# Patient Record
Sex: MALE | Race: BLACK OR AFRICAN AMERICAN | NOT HISPANIC OR LATINO | URBAN - METROPOLITAN AREA
[De-identification: names, ages, dates, MRNs, and addresses within clinical notes are randomized per-mention and may not be internally consistent; named-entity substitution may affect disease eponyms.]

---

## 2019-04-18 ENCOUNTER — TELEPHONE (OUTPATIENT)
Dept: INTERNAL MEDICINE | Facility: CLINIC | Age: 54
End: 2019-04-18

## 2019-04-18 ENCOUNTER — OFFICE VISIT (OUTPATIENT)
Dept: INTERNAL MEDICINE | Facility: CLINIC | Age: 54
End: 2019-04-18
Payer: COMMERCIAL

## 2019-04-18 ENCOUNTER — HOSPITAL ENCOUNTER (OUTPATIENT)
Dept: RADIOLOGY | Facility: OTHER | Age: 54
Discharge: HOME | End: 2019-04-18
Attending: FAMILY MEDICINE
Payer: COMMERCIAL

## 2019-04-18 VITALS
OXYGEN SATURATION: 98 % | HEIGHT: 77 IN | WEIGHT: 202.63 LBS | DIASTOLIC BLOOD PRESSURE: 79 MMHG | SYSTOLIC BLOOD PRESSURE: 118 MMHG | BODY MASS INDEX: 23.92 KG/M2 | TEMPERATURE: 98 F | HEART RATE: 72 BPM

## 2019-04-18 DIAGNOSIS — F39 MOOD DISORDER: ICD-10-CM

## 2019-04-18 DIAGNOSIS — J30.9 CHRONIC ALLERGIC RHINITIS: ICD-10-CM

## 2019-04-18 DIAGNOSIS — G89.29 CHRONIC NECK PAIN: ICD-10-CM

## 2019-04-18 DIAGNOSIS — M54.2 CHRONIC NECK PAIN: ICD-10-CM

## 2019-04-18 DIAGNOSIS — Z00.00 ANNUAL PHYSICAL EXAM: Primary | ICD-10-CM

## 2019-04-18 DIAGNOSIS — Z13.1 SCREENING FOR DIABETES MELLITUS: ICD-10-CM

## 2019-04-18 DIAGNOSIS — Z87.898 HISTORY OF SNORING: ICD-10-CM

## 2019-04-18 DIAGNOSIS — N52.9 ERECTILE DYSFUNCTION, UNSPECIFIED ERECTILE DYSFUNCTION TYPE: ICD-10-CM

## 2019-04-18 DIAGNOSIS — R22.1 NECK MASS: ICD-10-CM

## 2019-04-18 DIAGNOSIS — Z13.220 SCREENING FOR LIPID DISORDERS: ICD-10-CM

## 2019-04-18 PROCEDURE — 99999 PR PBB SHADOW E&M-NEW PATIENT-LVL IV: CPT | Mod: PBBFAC,,, | Performed by: FAMILY MEDICINE

## 2019-04-18 PROCEDURE — 99386 PREV VISIT NEW AGE 40-64: CPT | Mod: S$GLB,,, | Performed by: FAMILY MEDICINE

## 2019-04-18 PROCEDURE — 72050 X-RAY EXAM NECK SPINE 4/5VWS: CPT | Mod: 26,,, | Performed by: RADIOLOGY

## 2019-04-18 PROCEDURE — 72050 X-RAY EXAM NECK SPINE 4/5VWS: CPT | Mod: TC,FY

## 2019-04-18 PROCEDURE — 99386 PR PREVENTIVE VISIT,NEW,40-64: ICD-10-PCS | Mod: S$GLB,,, | Performed by: FAMILY MEDICINE

## 2019-04-18 PROCEDURE — 99999 PR PBB SHADOW E&M-NEW PATIENT-LVL IV: ICD-10-PCS | Mod: PBBFAC,,, | Performed by: FAMILY MEDICINE

## 2019-04-18 PROCEDURE — 72050 XR CERVICAL SPINE COMPLETE 5 VIEW: ICD-10-PCS | Mod: 26,,, | Performed by: RADIOLOGY

## 2019-04-18 RX ORDER — PAROXETINE 10 MG/1
10 TABLET, FILM COATED ORAL EVERY MORNING
Qty: 30 TABLET | Refills: 1 | Status: SHIPPED | OUTPATIENT
Start: 2019-04-18

## 2019-04-18 RX ORDER — FLUTICASONE PROPIONATE 50 MCG
2 SPRAY, SUSPENSION (ML) NASAL DAILY
Qty: 16 G | Refills: 2 | Status: SHIPPED | OUTPATIENT
Start: 2019-04-18

## 2019-04-18 RX ORDER — SILDENAFIL 100 MG/1
50 TABLET, FILM COATED ORAL DAILY PRN
Qty: 6 TABLET | Refills: 3 | Status: SHIPPED | OUTPATIENT
Start: 2019-04-18 | End: 2019-05-08 | Stop reason: SDUPTHER

## 2019-04-18 NOTE — TELEPHONE ENCOUNTER
Please let the patient know I have reviewed his lab results and XR report.  His lab results look terrific, and essentially everything is normal, including blood counts, electrolytes, blood sugar, cholesterol levels, and kidney/liver/thyroid function.  He is doing a great job of taking care of his health.  His testosterone level is actually very normal and in fact much higher than I would have expected, and certainly is not the explanation for his concerns as discussed in clinic.  His neck XR is showing mild arthritis, which is very common and not worrisome at all.  There is a small area of calcification on the front of the neck, which is difficult to identify.  At this point it does not look like anything to worry about, but let's go ahead with a CT scan to evaluate further.  There are no additional details to go off of on that.  We can also plan to discuss results in more detail at his next visit.  Please help to schedule the CT.  Thanks!

## 2019-04-18 NOTE — PATIENT INSTRUCTIONS
Prevention Guidelines, Men Ages 50 to 64  Screening tests and vaccines are an important part of managing your health. Health counseling is essential, too. Below are guidelines for these, for men ages 50 to 64. Talk with your healthcare provider to make sure youre up-to-date on what you need.  Screening Who needs it How often   Alcohol misuse All men in this age group At routine exams   Blood pressure All men in this age group Every 2 years if your blood pressure is less than 120/80 mm Hg; yearly if your systolic blood pressure is 120 to 139 mm Hg, or your diastolic blood pressure reading is 80 to 89 mm Hg   Colorectal cancer All men in this age group Flexible sigmoidoscopy every 5 years, or colonoscopy every 10 years, or double-contrast barium enema every 5 years; yearly fecal occult blood test or fecal immunochemical test; or a stool DNA test as often as your healthcare provider advises; talk with your healthcare provider about which tests are best for you   Depression All men in this age group At routine exams   Type 2 diabetes or prediabetes All adults beginning at age 45 and adults without symptoms at any age who are overweight or obese and have 1 or more other risk factors for diabetes At least every 3 years (yearly if your blood sugar has already begun to rise)   Hepatitis C Men at increased risk for infection - talk with your healthcare provider At routine exams. All men ages 50 to 70 should be tested at least once for hepatitis C.   High cholesterol or triglycerides All men in this age group At least every 5 years   HIV Men at increased risk for infection - talk with your healthcare provider At routine exams   Lung cancer Adults age 55 to 80 who have smoked Yearly screening in smokers with 30 pack-year history of smoking or who quit within 15 years   Obesity All men in this age group At routine exams   Prostate cancer Starting at age 45, talk to healthcare provider about risks and benefits of digital  rectal exam (HESHAM) and prostate-specific antigen (PSA) screening1 At routine exams   Syphilis Men at increased risk for infection - talk with your healthcare provider At routine exams   Tuberculosis Men at increased risk for infection - talk with your healthcare provider Ask your healthcare provider   Vision All men in this age group Ask your healthcare provider   Vaccine Who needs it How often   Chickenpox (varicella) All men in this age group who have no record of this infection or vaccine 2 doses; second dose should be given at least 4 weeks after the first dose   Hepatitis A Men at increased risk for infection - talk with your healthcare provider 2 doses given at least 6 months apart   Hepatitis B Men at increased risk for infection - talk with your healthcare provider 3 doses over 6 months; second dose should be given 1 month after the first dose; the third dose should be given at least 2 months after the second dose and at least 4 months after the first dose   Haemophilus influenzae Type B (HIB) Men at increased risk for infection - talk with your healthcare provider 1 to 3 doses   Influenza (flu) All men in this age group Once a year   Measles, mumps, rubella (MMR) Men in this age group through their late 50s who have no record of these infections or vaccines 1 or 2 doses; ask your healthcare provider   Meningococcal Men at increased risk for infection - talk with your healthcare provider 1 or more doses   Pneumococcal conjugate vaccine (PCV13) and pneumococcal polysaccharide vaccine (PPSV23) Men at increased risk for infection - talk with your healthcare provider PCV13: 1 dose ages 19 to 65 (protects against 13 types of pneumococcal bacteria)     PPSV23: 1 to 2 doses through age 64, or 1 dose at 65 or older (protects against 23 types of pneumococcal bacteria)      Tetanus/diphtheria/  pertussis (Td/Tdap) booster All men in this age group Td every 10 years, or a one-time dose of Tdap instead of a Td booster  after age 18, then Td every 10 years   Zoster All men ages 60 and older 1 dose   Counseling Who needs it How often   Diet and exercise Men who are overweight or obese When diagnosed, and then at routine exams   Sexually transmitted infection prevention Men at increased risk for infection - talk with your healthcare provider At routine exams   Use of daily aspirin Men in this age group at risk for cardiovascular health problems At routine exams   Use of tobacco and the health effects it can cause All men in this age group Every visit   [address] [place] [address] [facility]  Date Last Reviewed: 2/1/2017 © 2000-2017 [employer]. [address] [place] [address]. All rights reserved. This information is not intended as a substitute for professional medical care. Always follow your healthcare professional's instructions.        Paroxetine tablets  What is this medicine?  PAROXETINE (pa JARED e teen) is used to treat depression. It may also be used to treat anxiety disorders, obsessive compulsive disorder, panic attacks, post traumatic stress, and premenstrual dysphoric disorder (PMDD).  How should I use this medicine?  Take this medicine by mouth with a glass of water. Follow the directions on the prescription label. You can take it with or without food. Take your medicine at regular intervals. Do not take your medicine more often than directed. Do not stop taking this medicine suddenly except upon the advice of your doctor. Stopping this medicine too quickly may cause serious side effects or your condition may worsen.  A special MedGuide will be given to you by the pharmacist with each prescription and refill. Be sure to read this information carefully each time.  Talk to your pediatrician regarding the use of this medicine in children. Special care may be needed.  What side effects may I notice from receiving this medicine?  Side effects that you should report to your doctor or health care  professional as soon as possible:  · allergic reactions like skin rash, itching or hives, swelling of the face, lips, or tongue  · black or bloody stools, blood in the urine or vomit  · fast, irregular heartbeat  · hallucination, loss of contact with reality  · painful or prolonged erection (men)  · seizures  · suicidal thoughts or other mood changes  · trouble passing urine or change in the amount of urine  · unusual bleeding or bruising  · unusually weak or tired  · vomiting  Side effects that usually do not require medical attention (report to your doctor or health care professional if they continue or are bothersome):  · change in appetite, weight  · change in sex drive or performance  · constipation or diarrhea  · difficulty sleeping  · drowsy  · headache  · increased sweating  · muscle pain or weakness  · tremors  What may interact with this medicine?  Do not take this medicine with any of the following medications:  · linezolid  · MAOIs like Carbex, Eldepryl, Marplan, Nardil, and Parnate  · methylene blue (injected into a vein)  · pimozide  · thioridazine  This medicine may also interact with the following medications:  · alcohol  · aspirin and aspirin-like medicines  · atomoxetine  · certain medicines for depression, anxiety, or psychotic disturbances  · certain medicines for irregular heart beat like propafenone, flecainide, encainide, and quinidine  · certain medicines for migraine headache like almotriptan, eletriptan, frovatriptan, naratriptan, rizatriptan, sumatriptan, zolmitriptan  · cimetidine  · digoxin  · diuretics  · fentanyl  · fosamprenavir/ritonavir  · furazolidone  · isoniazid  · lithium  · medicines that treat or prevent blood clots like warfarin, enoxaparin, and dalteparin  · medicines for sleep  · metoprolol  · NSAIDs, medicines for pain and inflammation, like ibuprofen or naproxen  · phenobarbital  · phenytoin  · procarbazine  · procyclidine  · rasagiline  · supplements like Mala's  wort, kava kava, valerian  · tamoxifen  · theophylline  · tramadol  · tryptophan  What if I miss a dose?  If you miss a dose, take it as soon as you can. If it is almost time for your next dose, take only that dose. Do not take double or extra doses.  Where should I keep my medicine?  Keep out of the reach of children.  Store at room temperature between 15 and 30 degrees C (59 and 86 degrees F). Keep container tightly closed. Throw away any unused medicine after the expiration date.  What should I tell my health care provider before I take this medicine?  They need to know if you have any of these conditions:  · bleeding disorders  · glaucoma  · heart disease  · kidney disease  · liver disease  · low levels of sodium in the blood  · allyssa or bipolar disorder  · seizures  · suicidal thoughts, plans, or attempt; a previous suicide attempt by you or a family member  · take MAOIs like Carbex, Eldepryl, Marplan, Nardil, and Parnate  · take medicines that treat or prevent blood clots  · an unusual or allergic reaction to paroxetine, other medicines, foods, dyes, or preservatives  · pregnant or trying to get pregnant  · breast-feeding  What should I watch for while using this medicine?  Tell your doctor if your symptoms do not get better or if they get worse. Visit your doctor or health care professional for regular checks on your progress. Because it may take several weeks to see the full effects of this medicine, it is important to continue your treatment as prescribed by your doctor.  Patients and their families should watch out for new or worsening thoughts of suicide or depression. Also watch out for sudden changes in feelings such as feeling anxious, agitated, panicky, irritable, hostile, aggressive, impulsive, severely restless, overly excited and hyperactive, or not being able to sleep. If this happens, especially at the beginning of treatment or after a change in dose, call your health care professional.  You may  get drowsy or dizzy. Do not drive, use machinery, or do anything that needs mental alertness until you know how this medicine affects you. Do not stand or sit up quickly, especially if you are an older patient. This reduces the risk of dizzy or fainting spells. Alcohol may interfere with the effect of this medicine. Avoid alcoholic drinks.  Your mouth may get dry. Chewing sugarless gum or sucking hard candy, and drinking plenty of water will help. Contact your doctor if the problem does not go away or is severe.  NOTE:This sheet is a summary. It may not cover all possible information. If you have questions about this medicine, talk to your doctor, pharmacist, or health care provider. Copyright© 2017 [employer]

## 2019-04-18 NOTE — LETTER
April 18, 2019      [facility]/ Internal Medicine  [address] [place] [address]  Phone: [phone]  Fax: [phone]       Patient: Carter Neves   YOB: 1965  Date of Visit: 04/18/2019    To Whom It May Concern:    Josué Neves  was at [facility] on 04/18/2019. He may return to work/school on 4/19/19 with no restrictions. If you have any questions or concerns, or if I can be of further assistance, please do not hesitate to contact me.    Sincerely,    Fanny Perry LPN

## 2019-04-18 NOTE — PROGRESS NOTES
[facility]  Clinic Note      Subjective:       Patient ID: Carter Neves is a 53 y.o. male.    Chief Complaint: Establish Care    New patient is here today for annual visit and to establish care.  He notes several concerns at present.  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  He notes he is overdue for an annual checkup, although he did have a colonoscopy three years ago and reports it was normal.  He denies any urinary symptoms.  He notes his biggest concern is ongoing chronic mood disturbance.  He is here today with his girlfriend, who provides much of the history.  He had a recent divorce and his relationship with his daughter has become estranged, which troubles him greatly.  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  This has caused intermittent panic episodes, with classic chest discomfort and subjective dyspnea.  He has more chronic depression symptoms, and several months of dysphoria and anhedonia.  He has chronic erectile dysfunction and fatigue.  He has never had any treatment with depression or anxiety medications.  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  He has prior had Viagra for the ED symptoms, and notes it was effective.  He has had progressive left shoulder pain and associated muscle stiffness in the neck and trapezius.  This is associated with chronic neck pain and intermittent headache which is uncertain to be related.  He denies any motor weakness, radiculopathy, or paresthesias.  He has had a chronic cough and endorses allergic rhinitis symptoms.  No recent illness or fever.    Review of Systems   Constitutional: Positive for fatigue. Negative for fever.   HENT: Positive for congestion, postnasal drip and rhinorrhea.  "Negative for sinus pressure, sinus pain and sore throat.    Respiratory: Positive for cough and wheezing (intermittent). Negative for shortness of breath.    Cardiovascular: Negative for chest pain and palpitations.   Gastrointestinal: Negative for abdominal pain, diarrhea, nausea and vomiting.   Genitourinary: Negative for difficulty urinating, dysuria and hematuria.   Musculoskeletal: Positive for back pain, myalgias, neck pain and neck stiffness. Negative for arthralgias.   Neurological: Positive for headaches. Negative for dizziness and syncope.   Psychiatric/Behavioral: Positive for dysphoric mood. Negative for sleep disturbance. The patient is nervous/anxious.        Objective:      /79   Pulse 72   Temp 98.2 °F (36.8 °C)   Ht 6' 5" (1.956 m)   Wt 91.9 kg (202 lb 9.6 oz)   SpO2 98%   BMI 24.03 kg/m²   Physical Exam   Constitutional: He is oriented to person, place, and time. He appears well-developed and well-nourished. No distress.   HENT:   Head: Normocephalic and atraumatic.   Right Ear: Tympanic membrane and ear canal normal. Tympanic membrane is not erythematous. No middle ear effusion.   Left Ear: Tympanic membrane and ear canal normal. Tympanic membrane is not erythematous.  No middle ear effusion.   Nose: Mucosal edema and rhinorrhea present. Right sinus exhibits no maxillary sinus tenderness and no frontal sinus tenderness. Left sinus exhibits no maxillary sinus tenderness and no frontal sinus tenderness.   Mouth/Throat: Mucous membranes are normal. Posterior oropharyngeal erythema present. No posterior oropharyngeal edema.   Eyes: Pupils are equal, round, and reactive to light. Conjunctivae are normal.   Neck: Normal range of motion. No thyromegaly present.   Cardiovascular: Normal rate and regular rhythm.   No murmur heard.  Pulmonary/Chest: Effort normal and breath sounds normal. No respiratory distress. He has no wheezes. He has no rales.   Abdominal: Soft. Bowel sounds are normal. He " exhibits no distension. There is no tenderness. There is no guarding.   Musculoskeletal: He exhibits no edema.        Left shoulder: He exhibits normal range of motion, no tenderness, no bony tenderness, no deformity, no pain, no spasm, normal pulse and normal strength.        Cervical back: He exhibits decreased range of motion, tenderness, pain and spasm. He exhibits no bony tenderness and no deformity.   Diffuse left trapezius tightness and mild TTP.  Left shoulder:  Full Can/Empty Can: negative  Speed/Yergason: negative  Neer/Michel: negative  Apley/Cross-arm: negative  Full motor and sensation in left arm/hand intact   Neurological: He is alert and oriented to person, place, and time. No cranial nerve deficit.   Skin: Skin is warm and dry. No rash noted.   Psychiatric: He has a normal mood and affect. His behavior is normal.   Nursing note and vitals reviewed.      Assessment:       1. Annual physical exam    2. Screening for diabetes mellitus    3. Screening for lipid disorders    4. Mood disorder    5. Chronic neck pain    6. Erectile dysfunction, unspecified erectile dysfunction type    7. Chronic allergic rhinitis    8. History of snoring        Plan:     1. Annual physical exam  2. Screening for diabetes mellitus  3. Screening for lipid disorders  - routine health maintenance counseling provided, health history reviewed, preventive health testing recommended for age reviewed and tests ordered below   - - Lipid panel; Future  - - Hemoglobin A1c; Future  - - Comprehensive metabolic panel; Future    4. Mood disorder  - history reviewed, differential reviewed, mixed chronic depression and anxiety  - non-pharmacologic treatment measures reviewed, including benefit of routine/structured exercise program and therapy/counseling  - treatment options reviewed, basic pharmacology reviewed, have recommended SSRI trial and dosing instructions and potential side effects reviewed, handout provided   - - paroxetine  (PAXIL) 10 MG tablet; Take 1 tablet (10 mg total) by mouth every morning.  Dispense: 30 tablet; Refill: 1  - will monitor clinical course and response to treatment, have advised that additional options for as-needed anxiolysis are going to be greatly limited by his profession as a , patient expressed understanding  - questions answered, warning signs reviewed, patient is comfortable with this plan and was encouraged to call the office for any concerns or worsening of condition     5. Chronic neck pain  - history and exam findings reviewed, very likely trapezius strain as exam is quite reassuring  - evaluation options reviewed, have advised we can order an XR but would recommend to defer imaging unless/until any paresthesias or weakness in his arm or hand, patient expressed understanding  - - X-Ray Cervical Spine Complete 5 view; Future  - supportive care measures reviewed  - treatment options reviewed, options limited by profession as per above, for now would suggest trial of OTC topical analgesics and depending on response we discussed a possible trial of compounded pain medication from [facility] if needed    6. Erectile dysfunction, unspecified erectile dysfunction type  - chronic, likely multifactorial, basic pathophysiology reviewed  - evaluation options reviewed, will check labs for any potential underlying cause  - - CBC auto differential; Future  - - Testosterone; Future  - - TSH; Future  - treatment options reviewed, if low testosterone present would be able to address that, but advised it will likely come down to PDE-5i trial and dosing instructions and potential side effects reviewed, patient is interested in local pharmacy discount program, Rx for this medication sent to [facility]  - - sildenafil (VIAGRA) 100 MG tablet; Take 0.5 tablets (50 mg total) by mouth daily as needed for Erectile Dysfunction (take one hour prior to desired activity).  Dispense: 6 tablet; Refill: 3    7.  Chronic allergic rhinitis  - chronic cough and allergy symptoms, advised that these are often related  - supportive and preventive care measures reviewed, have recommended routine nasal saline sprays/rinses, instructions reviewed  - treatment options reviewed, have recommended Flonase trial and have advised to limit/avoid antihistamines which might cause drowsiness during driving  - - fluticasone (FLONASE) 50 mcg/actuation nasal spray; 2 sprays (100 mcg total) by Each Nare route once daily.  Dispense: 16 g; Refill: 2    8. History of snoring  - history of snoring, possible apneic episodes, no daytime sleepiness or falling asleep behind the wheel  - have advised the patient that this is critical to get evaluated as it may impact his ability to drive safely, and patient expressed understanding  - referral for sleep study placed, it seems patient may be open to using CPAP and advised this would be a requirement if he is diagnosed with LYNDA and wants to continue driving  - - Ambulatory referral to Sleep Disorders     - Follow up in about 1 month (around 5/16/2019) for follow-up medications.     River Johnson MD  4/18/2019

## 2019-04-18 NOTE — LETTER
April 23, 2019    Carter Neves  [address] [place] [address]             [facility] - Family Medicine/ Internal Medicine  [address] [address]  Phone: [phone]  Fax: [phone] Dear Carter Neves,    Our office has been unsuccessful in our attempts to contact you via telephone. Please contact our office for information and instructions based on your recent laboratory studies.     You may reach our office at [phone] or contact us via [facility].     Thanks you for you Prompt attention to this matter.  Ellen

## 2019-04-25 ENCOUNTER — TELEPHONE (OUTPATIENT)
Dept: INTERNAL MEDICINE | Facility: CLINIC | Age: 54
End: 2019-04-25

## 2019-05-01 ENCOUNTER — PATIENT OUTREACH (OUTPATIENT)
Dept: ADMINISTRATIVE | Facility: HOSPITAL | Age: 54
End: 2019-05-01

## 2019-05-01 DIAGNOSIS — Z12.12 SCREENING FOR COLORECTAL CANCER: Primary | ICD-10-CM

## 2019-05-01 DIAGNOSIS — Z12.11 SCREENING FOR COLORECTAL CANCER: Primary | ICD-10-CM

## 2019-05-01 DIAGNOSIS — Z11.59 NEED FOR HEPATITIS C SCREENING TEST: ICD-10-CM

## 2019-05-01 NOTE — PROGRESS NOTES
Chart review completed. Orders were placed and the patient was phoned.     eDlla MEJIAS LPN  Clinical Care Coordinator  Internal Medicine  [facility]

## 2019-05-08 DIAGNOSIS — N52.9 ERECTILE DYSFUNCTION, UNSPECIFIED ERECTILE DYSFUNCTION TYPE: ICD-10-CM

## 2019-05-08 RX ORDER — SILDENAFIL 100 MG/1
50 TABLET, FILM COATED ORAL DAILY PRN
Qty: 6 TABLET | Refills: 3 | Status: SHIPPED | OUTPATIENT
Start: 2019-05-08

## 2019-05-08 NOTE — TELEPHONE ENCOUNTER
----- Message from Latonya Diaz sent at 5/8/2019  1:14 PM CDT -----  Name of Who is Calling: MellissaSOHAM MCCAULEY [[id number]]      What is the request in detail: Pt wants sildenafil (VIAGRA) 100 MG tablet transferred to [facility] [address], [place] [address] because he now lives in [place]     Can the clinic reply by [facility]:   No       What Number to Call Back if not in [facility]: [phone]

## 2020-05-14 DIAGNOSIS — Z12.11 COLON CANCER SCREENING: ICD-10-CM
